# Patient Record
Sex: MALE | Race: BLACK OR AFRICAN AMERICAN | Employment: FULL TIME | URBAN - METROPOLITAN AREA
[De-identification: names, ages, dates, MRNs, and addresses within clinical notes are randomized per-mention and may not be internally consistent; named-entity substitution may affect disease eponyms.]

---

## 2022-07-27 ENCOUNTER — HOSPITAL ENCOUNTER (EMERGENCY)
Age: 32
Discharge: HOME | End: 2022-07-27
Payer: COMMERCIAL

## 2022-07-27 VITALS
OXYGEN SATURATION: 99 % | HEIGHT: 71 IN | HEART RATE: 77 BPM | DIASTOLIC BLOOD PRESSURE: 85 MMHG | WEIGHT: 160 LBS | RESPIRATION RATE: 16 BRPM | TEMPERATURE: 98.1 F | BODY MASS INDEX: 22.4 KG/M2 | SYSTOLIC BLOOD PRESSURE: 135 MMHG

## 2022-07-27 DIAGNOSIS — Z20.2 POSSIBLE EXPOSURE TO STD: Primary | ICD-10-CM

## 2022-07-27 LAB
BACTERIA: NORMAL /HPF
BILIRUBIN URINE: NEGATIVE
BLOOD, URINE: NEGATIVE
CLARITY: CLEAR
COLOR: YELLOW
GLUCOSE URINE: NEGATIVE MG/DL
KETONES, URINE: NEGATIVE MG/DL
LEUKOCYTE ESTERASE, URINE: NEGATIVE
NITRITE, URINE: NEGATIVE
PH UA: 6 (ref 5–9)
PROTEIN UA: NEGATIVE MG/DL
RBC UA: NORMAL /HPF (ref 0–2)
SPECIFIC GRAVITY UA: 1.02 (ref 1–1.03)
UROBILINOGEN, URINE: 0.2 E.U./DL
WBC UA: NORMAL /HPF (ref 0–5)

## 2022-07-27 PROCEDURE — 87491 CHLMYD TRACH DNA AMP PROBE: CPT

## 2022-07-27 PROCEDURE — 81001 URINALYSIS AUTO W/SCOPE: CPT

## 2022-07-27 PROCEDURE — 96372 THER/PROPH/DIAG INJ SC/IM: CPT

## 2022-07-27 PROCEDURE — 2500000003 HC RX 250 WO HCPCS: Performed by: PHYSICIAN ASSISTANT

## 2022-07-27 PROCEDURE — 6370000000 HC RX 637 (ALT 250 FOR IP): Performed by: PHYSICIAN ASSISTANT

## 2022-07-27 PROCEDURE — 99284 EMERGENCY DEPT VISIT MOD MDM: CPT

## 2022-07-27 PROCEDURE — 6360000002 HC RX W HCPCS: Performed by: PHYSICIAN ASSISTANT

## 2022-07-27 PROCEDURE — 87591 N.GONORRHOEAE DNA AMP PROB: CPT

## 2022-07-27 RX ORDER — AZITHROMYCIN 250 MG/1
1000 TABLET, FILM COATED ORAL ONCE
Status: COMPLETED | OUTPATIENT
Start: 2022-07-27 | End: 2022-07-27

## 2022-07-27 RX ADMIN — AZITHROMYCIN 1000 MG: 250 TABLET, FILM COATED ORAL at 21:28

## 2022-07-27 RX ADMIN — LIDOCAINE HYDROCHLORIDE 500 MG: 10 INJECTION, SOLUTION EPIDURAL; INFILTRATION; INTRACAUDAL; PERINEURAL at 21:27

## 2022-07-27 ASSESSMENT — PAIN - FUNCTIONAL ASSESSMENT: PAIN_FUNCTIONAL_ASSESSMENT: NONE - DENIES PAIN

## 2022-07-28 NOTE — ED PROVIDER NOTES
[facility]  Department of Emergency Medicine   ED  Encounter Note  Admit Date/RoomTime: 2022  9:10 PM  ED Room:     NAME: Suma Desouza  : 1990  MRN: [id number]     Chief Complaint:  Exposure to STD (Needs tested for STD)    History of Present Illness      Suma Desouza is a 34 y.o. old male who presents to the emergency department by private vehicle, for possible exposure to possible STD with penile discharge, which occured 6 day(s) prior to arrival.  Since exposure/onset there has been no developing symptoms. He denies any genital rash, genital ulcers, scrotal mass, scrotal pain, fever, abdominal pain, back pain, dysuria, or urinary frequency. His prior STD history: No prior history of sexually transmitted disease. Patient stated that about 6 days ago he noticed some yellowish penile drainage. Patient states that he has had unprotected sex with a female. He denies fevers or chills. He denies any urinary symptoms. Patient appears well, nontoxic and in no distress. No other complaints or concerns at this time. ROS   Pertinent positives and negatives are stated within HPI, all other systems reviewed and are negative. Past Medical History:  has a past medical history of Asthma. Surgical History:  has no past surgical history on file. Social History:  reports that he has been smoking cigarettes. He has been smoking an average of .5 packs per day. He does not have any smokeless tobacco history on file. He reports that he does not drink alcohol and does not use drugs. Family History: family history is not on file. Allergies: Patient has no known allergies.     Physical Exam   Oxygen Saturation Interpretation: Normal.        ED Triage Vitals [22]   BP Temp Temp Source Heart Rate Resp SpO2 Height Weight   135/85 98.1 °F (36.7 °C) Oral 77 16 99 % 5' 11\" (1.803 m) 160 lb (72.6 kg)         Constitutional:  Alert, development consistent with Decision Making:    Patient presents to the emergency department for penile discharge which began 6 days ago. Patient states it has been intermittent. Patient states he is concerned for an STD exposure due to having unprotected sex. He denies any urinary symptoms. Urinalysis was obtained. Urinalysis showed no evidence of a urinary tract infection. Plan to obtain cultures and treat for suspected STD and provide outpatient follow-up instructions. Patient educated on safer sex practices, and advised to abstain from sexual intercourse for the next 7 to 10 days after treatment. Plan is for discharge with outpatient management and follow-up with PCP. Patient agreeable to this plan. At this time the patient is without objective evidence of an acute process requiring hospitalization or inpatient management. They have remained hemodynamically stable throughout their entire ED visit and are stable for discharge with outpatient follow-up. The plan has been discussed in detail and they are aware of the specific conditions for emergent return, as well as the importance of follow-up. Plan of Care/Counseling:  MELIDA Eddy CNP reviewed today's visit with the patient in addition to providing specific details for the plan of care and counseling regarding the diagnosis and prognosis. Questions are answered at this time and are agreeable with the plan. Assessment     1. Possible exposure to STD      Plan   Discharged home. Patient condition is good    New Medications     New Prescriptions    No medications on file     Electronically signed by MELIDA Eddy CNP   DD: 7/27/22  **This report was transcribed using voice recognition software. Every effort was made to ensure accuracy; however, inadvertent computerized transcription errors may be present.   END OF ED PROVIDER NOTE       MELIDA Eddy CNP  07/27/22 [id number]

## 2022-08-01 LAB
C. TRACHOMATIS DNA ,URINE: NEGATIVE
N. GONORRHOEAE DNA, URINE: NEGATIVE
SOURCE: NORMAL

## 2022-11-16 ENCOUNTER — HOSPITAL ENCOUNTER (EMERGENCY)
Age: 32
Discharge: HOME | End: 2022-11-16
Payer: COMMERCIAL

## 2022-11-16 VITALS
OXYGEN SATURATION: 98 % | TEMPERATURE: 97.9 F | HEART RATE: 68 BPM | HEIGHT: 71 IN | BODY MASS INDEX: 22.4 KG/M2 | SYSTOLIC BLOOD PRESSURE: 115 MMHG | DIASTOLIC BLOOD PRESSURE: 90 MMHG | RESPIRATION RATE: 16 BRPM | WEIGHT: 160 LBS

## 2022-11-16 DIAGNOSIS — R36.9 PENILE DISCHARGE: Primary | ICD-10-CM

## 2022-11-16 DIAGNOSIS — Z20.2 POSSIBLE EXPOSURE TO STD: ICD-10-CM

## 2022-11-16 LAB
BACTERIA: ABNORMAL /HPF
BILIRUBIN URINE: NEGATIVE
BLOOD, URINE: NEGATIVE
CLARITY: CLEAR
CLUE CELLS: NORMAL
COLOR: YELLOW
GLUCOSE URINE: NEGATIVE MG/DL
KETONES, URINE: NEGATIVE MG/DL
LEUKOCYTE ESTERASE, URINE: ABNORMAL
MUCUS: PRESENT /LPF
NITRITE, URINE: NEGATIVE
PH UA: 6 (ref 5–9)
PROTEIN UA: NEGATIVE MG/DL
RBC UA: ABNORMAL /HPF (ref 0–2)
SOURCE WET PREP: NORMAL
SPECIFIC GRAVITY UA: 1.02 (ref 1–1.03)
TRICHOMONAS PREP: NORMAL
UROBILINOGEN, URINE: 0.2 E.U./DL
WBC UA: ABNORMAL /HPF (ref 0–5)
YEAST WET PREP: NORMAL

## 2022-11-16 PROCEDURE — 99284 EMERGENCY DEPT VISIT MOD MDM: CPT

## 2022-11-16 PROCEDURE — 87491 CHLMYD TRACH DNA AMP PROBE: CPT

## 2022-11-16 PROCEDURE — 87591 N.GONORRHOEAE DNA AMP PROB: CPT

## 2022-11-16 PROCEDURE — 96372 THER/PROPH/DIAG INJ SC/IM: CPT

## 2022-11-16 PROCEDURE — 87210 SMEAR WET MOUNT SALINE/INK: CPT

## 2022-11-16 PROCEDURE — 6360000002 HC RX W HCPCS: Performed by: NURSE PRACTITIONER

## 2022-11-16 PROCEDURE — 6370000000 HC RX 637 (ALT 250 FOR IP): Performed by: NURSE PRACTITIONER

## 2022-11-16 PROCEDURE — 81001 URINALYSIS AUTO W/SCOPE: CPT

## 2022-11-16 RX ORDER — AZITHROMYCIN 250 MG/1
1000 TABLET, FILM COATED ORAL ONCE
Status: COMPLETED | OUTPATIENT
Start: 2022-11-16 | End: 2022-11-16

## 2022-11-16 RX ORDER — CEFTRIAXONE 500 MG/1
500 INJECTION, POWDER, FOR SOLUTION INTRAMUSCULAR; INTRAVENOUS ONCE
Status: COMPLETED | OUTPATIENT
Start: 2022-11-16 | End: 2022-11-16

## 2022-11-16 RX ADMIN — AZITHROMYCIN 1000 MG: 250 TABLET, FILM COATED ORAL at 13:47

## 2022-11-16 RX ADMIN — CEFTRIAXONE SODIUM 500 MG: 500 INJECTION, POWDER, FOR SOLUTION INTRAMUSCULAR; INTRAVENOUS at 13:47

## 2022-11-16 ASSESSMENT — PAIN - FUNCTIONAL ASSESSMENT: PAIN_FUNCTIONAL_ASSESSMENT: NONE - DENIES PAIN

## 2022-11-16 NOTE — ED PROVIDER NOTES
[facility]  Department of Emergency Medicine   ED  Encounter Note  Admit Date/RoomTime: 2022 11:43 AM  ED Room:     NAME: Roland Castano  : 1990  MRN: [id number]     Chief Complaint:  Penile Discharge (Started this morning, denies burning) and Exposure to STD (Same sex partner but wants tested)    History of Present Illness      Roland Castano is a 34 y.o. old male who presents to the emergency department by private vehicle, for possible exposure to possible STD with penile discharge, which occured 1 day(s) prior to arrival.  Since exposure/onset there has been no change of symptoms. He denies any genital rash, genital ulcers, scrotal mass, scrotal pain, fever, abdominal pain, back pain, dysuria, urinary frequency, or hematuria. His prior STD history: Chlamydia and Gonorrhea. ROS   Pertinent positives and negatives are stated within HPI, all other systems reviewed and are negative. Past Medical History:  has a past medical history of Asthma. Surgical History:  has no past surgical history on file. Social History:  reports that he has been smoking cigarettes. He has been smoking an average of .5 packs per day. He does not have any smokeless tobacco history on file. He reports that he does not drink alcohol and does not use drugs. Family History: family history is not on file. Allergies: Patient has no known allergies. Physical Exam   Oxygen Saturation Interpretation: Normal.        ED Triage Vitals [22 1142]   BP Temp Temp Source Heart Rate Resp SpO2 Height Weight   (!) 113/103 97.5 °F (36.4 °C) Oral 86 16 98 % 5' 11\" (1.803 m) 160 lb (72.6 kg)         Constitutional:  Alert, development consistent with age. HEENT:  NC/NT. Airway patent  Neck:  Normal ROM. Supple.   Respiratory:  Lungs Clear to auscultation and breath sounds equal.  CV:  Regular rate and rhythm, normal heart sounds, without pathological murmurs, ectopy, gallops, or rubs. GI:  AbdomenSoft, nontender, good bowel sounds. No firm or pulsatile mass. : Genitalia Exam: (Same sex / third party chaperone present during examination, Carloz Desouza ED tech)        Penis: non focal circumcised. creamy discharge          Scrotum: normal.       Testicle: normal without masses bilateral.  Integument:  Normal turgor. Warm, dry, without visible rash, unless noted elsewhere. Lymphatics: No lymphangitis or adenopathy noted. Neurological:  Orientation age-appropriate. Motor functions intact. Lab / Imaging Results   (All laboratory and radiology results have been personally reviewed by myself)  Labs:  Results for orders placed or performed during the hospital encounter of 11/16/22   C.trachomatis N.gonorrhoeae DNA, Urine    Specimen: Urine   Result Value Ref Range    Source Urine    Wet prep, genital    Specimen: Penis   Result Value Ref Range    Trichomonas Prep None Seen     Yeast, Wet Prep None Seen     Clue Cells, Wet Prep None Seen     Source Wet Prep PENILE    Urinalysis with Microscopic   Result Value Ref Range    Color, UA Yellow Straw/Yellow    Clarity, UA Clear Clear    Glucose, Ur Negative Negative mg/dL    Bilirubin Urine Negative Negative    Ketones, Urine Negative Negative mg/dL    Specific Gravity, UA 1.025 1.005 - 1.030    Blood, Urine Negative Negative    pH, UA 6.0 5.0 - 9.0    Protein, UA Negative Negative mg/dL    Urobilinogen, Urine 0.2 <2.0 E.U./dL    Nitrite, Urine Negative Negative    Leukocyte Esterase, Urine TRACE (A) Negative    Mucus, UA Present (A) None Seen /LPF    WBC, UA 10-20 (A) 0 - 5 /HPF    RBC, UA NONE 0 - 2 /HPF    Bacteria, UA MANY (A) None Seen /HPF     Imaging: All Radiology results interpreted by Radiologist unless otherwise noted.   No orders to display     ED Course / Medical Decision Making     Medications   cefTRIAXone (ROCEPHIN) injection 500 mg (500 mg IntraMUSCular Given 11/16/22 1347)   azithromycin (ZITHROMAX) tablet 1,000 mg (1,000 mg Oral Given 11/16/22 1347)        Consult(s):   None    Procedure(s):   None    Medical Decision Making:    Plan to obtain cultures and treat for suspected STD and provide outpatient follow-up instructions. Patient presents to the ED for penile discharge that started this morning. Differential diagnoses included but not limited to gonorrhea versus chlamydia versus Trichomonas. Workup in the ED revealed wet prep was negative for yeast trichomonas and clue cells. Urinalysis had trace leukocytes white blood cells 10-20 and many bacteria. Patient given Zithromax and Rocephin. He did get the one-time dose to ensure compliance. His exam was benign. Educated on safe sex practices and to follow-up with his PCP for further STD and HIV testing. Patient continues to be non-toxic on re-evaluation. Findings were discussed with the patient and reasons to immediately return to the ED were articulated to them. They will follow-up with their PMD.      Plan of Care/Counseling:  MELIDA Herman CNP reviewed today's visit with the patient in addition to providing specific details for the plan of care and counseling regarding the diagnosis and prognosis. Questions are answered at this time and are agreeable with the plan. Assessment     1. Penile discharge    2. Possible exposure to STD      Plan   Discharged home. Patient condition is stable    New Medications     Discharge Medication List as of 11/16/2022  1:40 PM        Electronically signed by MELIDA Hermna CNP   DD: 11/16/22  **This report was transcribed using voice recognition software. Every effort was made to ensure accuracy; however, inadvertent computerized transcription errors may be present.   END OF ED PROVIDER NOTE       MELIDA Herman CNP  11/16/22 [id number]

## 2022-11-16 NOTE — DISCHARGE INSTRUCTIONS
Return the ER for any worsening symptoms  Follow-up with PCP for further evaluation  GC cultures pending at this time

## 2022-11-21 LAB
C. TRACHOMATIS DNA ,URINE: NEGATIVE
N. GONORRHOEAE DNA, URINE: NEGATIVE
SOURCE: NORMAL